# Patient Record
Sex: MALE
[De-identification: names, ages, dates, MRNs, and addresses within clinical notes are randomized per-mention and may not be internally consistent; named-entity substitution may affect disease eponyms.]

---

## 2023-09-19 ENCOUNTER — RX ONLY (RX ONLY)
Age: 68
End: 2023-09-19

## 2023-09-19 ENCOUNTER — OFFICE (OUTPATIENT)
Facility: LOCATION | Age: 68
Setting detail: OPHTHALMOLOGY
End: 2023-09-19
Payer: MEDICARE

## 2023-09-19 DIAGNOSIS — H35.033: ICD-10-CM

## 2023-09-19 DIAGNOSIS — H11.153: ICD-10-CM

## 2023-09-19 DIAGNOSIS — Z13.5: ICD-10-CM

## 2023-09-19 DIAGNOSIS — H52.4: ICD-10-CM

## 2023-09-19 DIAGNOSIS — H25.13: ICD-10-CM

## 2023-09-19 DIAGNOSIS — H16.223: ICD-10-CM

## 2023-09-19 DIAGNOSIS — H52.03: ICD-10-CM

## 2023-09-19 PROBLEM — H01.005 BLEPHARITIS; RIGHT UPPER LID, RIGHT LOWER LID, LEFT UPPER LID, LEFT LOWER LID: Status: ACTIVE | Noted: 2023-09-19

## 2023-09-19 PROBLEM — H01.002 BLEPHARITIS; RIGHT UPPER LID, RIGHT LOWER LID, LEFT UPPER LID, LEFT LOWER LID: Status: ACTIVE | Noted: 2023-09-19

## 2023-09-19 PROBLEM — H02.83 DERMATOCHALASIS ; BOTH EYES: Status: ACTIVE | Noted: 2023-09-19

## 2023-09-19 PROBLEM — H01.001 BLEPHARITIS; RIGHT UPPER LID, RIGHT LOWER LID, LEFT UPPER LID, LEFT LOWER LID: Status: ACTIVE | Noted: 2023-09-19

## 2023-09-19 PROBLEM — H01.004 BLEPHARITIS; RIGHT UPPER LID, RIGHT LOWER LID, LEFT UPPER LID, LEFT LOWER LID: Status: ACTIVE | Noted: 2023-09-19

## 2023-09-19 PROCEDURE — 92250 FUNDUS PHOTOGRAPHY W/I&R: CPT | Performed by: OPHTHALMOLOGY

## 2023-09-19 PROCEDURE — 92015 DETERMINE REFRACTIVE STATE: CPT | Performed by: OPHTHALMOLOGY

## 2023-09-19 PROCEDURE — 92014 COMPRE OPH EXAM EST PT 1/>: CPT | Performed by: OPHTHALMOLOGY

## 2023-09-19 ASSESSMENT — SUPERFICIAL PUNCTATE KERATITIS (SPK)
OS_SPK: 1+
OD_SPK: 1+

## 2023-09-19 ASSESSMENT — LID EXAM ASSESSMENTS
OS_BLEPHARITIS: LLL LUL 1+
OD_BLEPHARITIS: RLL RUL 1+

## 2023-09-19 ASSESSMENT — LID POSITION - DERMATOCHALASIS
OS_DERMATOCHALASIS: T
OD_DERMATOCHALASIS: T

## 2023-09-19 ASSESSMENT — CONFRONTATIONAL VISUAL FIELD TEST (CVF)
OD_FINDINGS: FULL
OS_FINDINGS: FULL

## 2023-09-19 ASSESSMENT — TONOMETRY
OD_IOP_MMHG: 16
OS_IOP_MMHG: 16

## 2023-09-21 ASSESSMENT — REFRACTION_CURRENTRX
OD_ADD: +2.25
OS_ADD: +2.25
OD_CYLINDER: +0.75
OS_AXIS: 6
OD_AXIS: 5
OD_OVR_VA: 20/
OD_VPRISM_DIRECTION: PROGS
OS_SPHERE: +1.25
OD_SPHERE: +1.25
OS_VPRISM_DIRECTION: PROGS
OS_OVR_VA: 20/
OS_CYLINDER: +0.50

## 2023-09-21 ASSESSMENT — REFRACTION_MANIFEST
OD_AXIS: 10
OD_VA1: 20/20-1
OD_ADD: +2.25
OS_CYLINDER: +0.50
OS_AXIS: 155
OD_SPHERE: +1.50
OS_SPHERE: +1.75
OS_ADD: +2.25
OS_VA1: 20/20-1
OD_CYLINDER: +1.00

## 2023-09-21 ASSESSMENT — REFRACTION_AUTOREFRACTION
OD_AXIS: 12
OS_CYLINDER: +1.25
OS_AXIS: 16
OD_SPHERE: +2.00
OS_SPHERE: +1.00
OD_CYLINDER: +1.00

## 2023-09-21 ASSESSMENT — SPHEQUIV_DERIVED
OD_SPHEQUIV: 2.5
OS_SPHEQUIV: 1.625
OD_SPHEQUIV: 2
OS_SPHEQUIV: 2

## 2023-09-21 ASSESSMENT — VISUAL ACUITY
OD_BCVA: 20/25-2
OS_BCVA: 20/20-2

## 2024-08-13 PROBLEM — Z00.00 ENCOUNTER FOR PREVENTIVE HEALTH EXAMINATION: Status: ACTIVE | Noted: 2024-08-13

## 2024-08-14 ENCOUNTER — APPOINTMENT (OUTPATIENT)
Dept: ORTHOPEDIC SURGERY | Facility: CLINIC | Age: 69
End: 2024-08-14
Payer: MEDICARE

## 2024-08-14 VITALS — WEIGHT: 160 LBS | BODY MASS INDEX: 25.71 KG/M2 | HEIGHT: 66 IN

## 2024-08-14 DIAGNOSIS — I10 ESSENTIAL (PRIMARY) HYPERTENSION: ICD-10-CM

## 2024-08-14 DIAGNOSIS — Z78.9 OTHER SPECIFIED HEALTH STATUS: ICD-10-CM

## 2024-08-14 DIAGNOSIS — Z86.69 PERSONAL HISTORY OF OTHER DISEASES OF THE NERVOUS SYSTEM AND SENSE ORGANS: ICD-10-CM

## 2024-08-14 DIAGNOSIS — M75.21 BICIPITAL TENDINITIS, RIGHT SHOULDER: ICD-10-CM

## 2024-08-14 PROCEDURE — 99204 OFFICE O/P NEW MOD 45 MIN: CPT

## 2024-08-14 RX ORDER — AMLODIPINE BESYLATE 5 MG/1
TABLET ORAL
Refills: 0 | Status: ACTIVE | COMMUNITY

## 2024-08-14 RX ORDER — ATORVASTATIN CALCIUM 80 MG/1
TABLET, FILM COATED ORAL
Refills: 0 | Status: ACTIVE | COMMUNITY

## 2024-08-14 NOTE — HISTORY OF PRESENT ILLNESS
[de-identified] : Age: 68 year M PMHx: Sleep apnea, hypertension Hand Dominance: RHD Chief Complaint: Patient presents for [facility] elbow pain evaluation. Patient states he was angry and slammed his fists down on a table 8/4/24 and he felt a pop in his elbow. Patient doesn't have pain when bending his arm, just pain twisting and weightbearing. Patient states his RT elbow is worse but is also having pain in his LT elbow. Patient states he was having a lot of pain playing golf a week later. Patient went to [facility] and was told nothing is broken. Patient has been icing, using a compression sleeve and taking Advil prn. Patient denies numbness/tingling. Trauma: Yes, 8/4/24 Outside Imaging/Treatment: x-rays at [facility] OTC Medications: advil OT/PT: none Bracing: compression sleeve Pain worse with: weightbearing, twisting Pain better with: ice, compression, advil

## 2024-08-14 NOTE — ASSESSMENT
[FreeTextEntry1] : Right distal biceps tendonitis - reviewed radiographs and pathoanatomy with patient. Discussed management to consist of NSAIDs prn, OT/PT, activity modification.  F/u prn

## 2024-08-14 NOTE — IMAGING
[de-identified] : RIGHT ELBOW EXAM +ttp at distal biceps tendon, discomfort with resisted supination. Palpable distal tendon. Skin intact No deformity, edema, or ecchymosis Hand with brisk capillary refill, warm and well perfused Motor function intact to AIN, PIN, ulnar nerves Sensation intact median, ulnar, radial nerves  Elbow ROM   Flexion 135   Extension to 0   Supination 85   Pronation 85  No elbow instability noted Strength for elbow flexion & extension is 5/5 Hand and wrist motion is intact and full Patient able to make a composite fist.  Right elbow radiographs with no fracture nor dislocation. No arthrosis. (3-view as viewed from outside facility)

## 2024-11-12 ENCOUNTER — OFFICE (OUTPATIENT)
Facility: LOCATION | Age: 69
Setting detail: OPHTHALMOLOGY
End: 2024-11-12
Payer: MEDICARE

## 2024-11-12 DIAGNOSIS — D23.112: ICD-10-CM

## 2024-11-12 PROBLEM — H40.11: Status: ACTIVE | Noted: 2024-11-12

## 2024-11-12 PROCEDURE — 99213 OFFICE O/P EST LOW 20 MIN: CPT | Performed by: OPHTHALMOLOGY

## 2024-11-12 PROCEDURE — 92285 EXTERNAL OCULAR PHOTOGRAPHY: CPT | Mod: RT | Performed by: OPHTHALMOLOGY

## 2024-11-12 ASSESSMENT — CONFRONTATIONAL VISUAL FIELD TEST (CVF)
OD_FINDINGS: FULL
OS_FINDINGS: FULL

## 2024-11-12 ASSESSMENT — LID EXAM ASSESSMENTS
OS_BLEPHARITIS: LLL LUL 1+
OD_BLEPHARITIS: RLL RUL 1+

## 2024-11-12 ASSESSMENT — SUPERFICIAL PUNCTATE KERATITIS (SPK)
OD_SPK: 1+
OS_SPK: 1+

## 2024-11-12 ASSESSMENT — LID POSITION - DERMATOCHALASIS
OS_DERMATOCHALASIS: T
OD_DERMATOCHALASIS: T

## 2024-11-14 ASSESSMENT — REFRACTION_CURRENTRX
OS_SPHERE: +1.75
OD_VPRISM_DIRECTION: PROGS
OS_CYLINDER: +0.25
OS_AXIS: 155
OD_ADD: +2.25
OD_SPHERE: +1.50
OD_CYLINDER: +1.00
OD_OVR_VA: 20/
OD_AXIS: 024
OS_ADD: +2.25
OS_OVR_VA: 20/
OS_VPRISM_DIRECTION: PROGS

## 2024-11-14 ASSESSMENT — REFRACTION_MANIFEST
OS_VA1: 20/20-1
OD_ADD: +2.25
OS_SPHERE: +1.75
OD_CYLINDER: +1.00
OD_SPHERE: +1.50
OS_AXIS: 155
OS_ADD: +2.25
OD_VA1: 20/20-1
OS_CYLINDER: +0.50
OD_AXIS: 10

## 2024-11-14 ASSESSMENT — REFRACTION_AUTOREFRACTION
OD_SPHERE: +2.00
OS_SPHERE: +1.00
OD_CYLINDER: +1.00
OD_AXIS: 12
OS_AXIS: 16
OS_CYLINDER: +1.25

## 2024-11-14 ASSESSMENT — VISUAL ACUITY
OD_BCVA: 20/20-2
OS_BCVA: 20/25

## 2025-01-03 ENCOUNTER — OFFICE (OUTPATIENT)
Facility: LOCATION | Age: 70
Setting detail: OPHTHALMOLOGY
End: 2025-01-03
Payer: MEDICARE

## 2025-01-03 DIAGNOSIS — D23.112: ICD-10-CM

## 2025-01-03 PROCEDURE — 11440 EXC FACE-MM B9+MARG 0.5 CM/<: CPT | Mod: E4 | Performed by: OPHTHALMOLOGY

## 2025-01-06 ASSESSMENT — LID POSITION - DERMATOCHALASIS
OS_DERMATOCHALASIS: T
OD_DERMATOCHALASIS: T

## 2025-01-06 ASSESSMENT — LID EXAM ASSESSMENTS
OS_BLEPHARITIS: LLL LUL 1+
OD_BLEPHARITIS: RLL RUL 1+

## 2025-01-06 ASSESSMENT — SUPERFICIAL PUNCTATE KERATITIS (SPK)
OD_SPK: 1+
OS_SPK: 1+

## 2025-01-16 ASSESSMENT — REFRACTION_MANIFEST
OS_SPHERE: +1.75
OS_ADD: +2.25
OS_VA1: 20/20-1
OS_AXIS: 155
OS_CYLINDER: +0.50
OD_AXIS: 10
OD_ADD: +2.25
OD_VA1: 20/20-1
OD_SPHERE: +1.50
OD_CYLINDER: +1.00

## 2025-01-16 ASSESSMENT — REFRACTION_AUTOREFRACTION
OD_AXIS: 12
OS_AXIS: 16
OD_CYLINDER: +1.00
OS_CYLINDER: +1.25
OD_SPHERE: +2.00
OS_SPHERE: +1.00

## 2025-01-16 ASSESSMENT — VISUAL ACUITY
OS_BCVA: 20/20
OD_BCVA: 20/20

## 2025-01-16 ASSESSMENT — REFRACTION_CURRENTRX
OS_ADD: +2.25
OD_SPHERE: +1.50
OS_OVR_VA: 20/
OD_VPRISM_DIRECTION: PROGS
OD_ADD: +2.25
OD_OVR_VA: 20/
OS_AXIS: 155
OD_AXIS: 024
OS_CYLINDER: +0.25
OS_VPRISM_DIRECTION: PROGS
OD_CYLINDER: +1.00
OS_SPHERE: +1.75

## 2025-02-12 ENCOUNTER — OFFICE (OUTPATIENT)
Facility: LOCATION | Age: 70
Setting detail: OPHTHALMOLOGY
End: 2025-02-12
Payer: MEDICARE

## 2025-02-12 DIAGNOSIS — D23.112: ICD-10-CM

## 2025-02-12 DIAGNOSIS — H25.13: ICD-10-CM

## 2025-02-12 PROBLEM — H01.004 BLEPHARITIS; RIGHT UPPER LID, RIGHT LOWER LID, LEFT UPPER LID, LEFT LOWER LID: Status: ACTIVE | Noted: 2025-02-12

## 2025-02-12 PROBLEM — H01.001 BLEPHARITIS; RIGHT UPPER LID, RIGHT LOWER LID, LEFT UPPER LID, LEFT LOWER LID: Status: ACTIVE | Noted: 2025-02-12

## 2025-02-12 PROBLEM — H01.002 BLEPHARITIS; RIGHT UPPER LID, RIGHT LOWER LID, LEFT UPPER LID, LEFT LOWER LID: Status: ACTIVE | Noted: 2025-02-12

## 2025-02-12 PROBLEM — H02.83 DERMATOCHALSIS ; BOTH EYES: Status: ACTIVE | Noted: 2025-02-12

## 2025-02-12 PROBLEM — H01.005 BLEPHARITIS; RIGHT UPPER LID, RIGHT LOWER LID, LEFT UPPER LID, LEFT LOWER LID: Status: ACTIVE | Noted: 2025-02-12

## 2025-02-12 PROCEDURE — 99213 OFFICE O/P EST LOW 20 MIN: CPT | Performed by: OPHTHALMOLOGY

## 2025-02-12 ASSESSMENT — LID EXAM ASSESSMENTS
OD_BLEPHARITIS: RLL RUL 1+
OS_BLEPHARITIS: LLL LUL 1+

## 2025-02-12 ASSESSMENT — SUPERFICIAL PUNCTATE KERATITIS (SPK)
OD_SPK: 1+
OS_SPK: 1+

## 2025-02-12 ASSESSMENT — CONFRONTATIONAL VISUAL FIELD TEST (CVF)
OD_FINDINGS: FULL
OS_FINDINGS: FULL

## 2025-02-12 ASSESSMENT — LID POSITION - DERMATOCHALASIS
OS_DERMATOCHALASIS: T
OD_DERMATOCHALASIS: T

## 2025-02-13 ASSESSMENT — REFRACTION_MANIFEST
OD_SPHERE: +1.50
OS_ADD: +2.25
OD_AXIS: 10
OS_SPHERE: +1.75
OS_VA1: 20/20-1
OS_AXIS: 155
OD_ADD: +2.25
OD_CYLINDER: +1.00
OS_CYLINDER: +0.50
OD_VA1: 20/20-1

## 2025-02-13 ASSESSMENT — REFRACTION_AUTOREFRACTION
OD_AXIS: 12
OS_SPHERE: +1.75
OD_SPHERE: +2.00
OD_CYLINDER: +0.75
OS_CYLINDER: +0.75
OS_AXIS: 162

## 2025-02-13 ASSESSMENT — REFRACTION_CURRENTRX
OS_AXIS: 155
OD_SPHERE: +1.50
OS_OVR_VA: 20/
OS_CYLINDER: +0.25
OS_SPHERE: +1.75
OS_ADD: +2.25
OS_VPRISM_DIRECTION: PROGS
OD_AXIS: 024
OD_ADD: +2.25
OD_CYLINDER: +1.00
OD_VPRISM_DIRECTION: PROGS
OD_OVR_VA: 20/

## 2025-02-13 ASSESSMENT — VISUAL ACUITY
OD_BCVA: 20/20
OS_BCVA: 20/20-2

## 2025-06-23 ENCOUNTER — APPOINTMENT (OUTPATIENT)
Dept: ORTHOPEDIC SURGERY | Facility: CLINIC | Age: 70
End: 2025-06-23

## 2025-06-23 VITALS — BODY MASS INDEX: 24.91 KG/M2 | WEIGHT: 155 LBS | HEIGHT: 66 IN

## 2025-06-23 PROBLEM — Z78.9 NO PERTINENT FAMILY HISTORY: Status: ACTIVE | Noted: 2025-06-23

## 2025-06-23 PROBLEM — M25.561 ACUTE PAIN OF RIGHT KNEE: Status: ACTIVE | Noted: 2025-06-23

## 2025-06-23 PROBLEM — M25.561 RIGHT KNEE PAIN: Status: ACTIVE | Noted: 2025-06-23

## 2025-06-23 PROBLEM — Z78.9 SOCIAL ALCOHOL USE: Status: ACTIVE | Noted: 2025-06-23

## 2025-06-23 PROCEDURE — 73564 X-RAY EXAM KNEE 4 OR MORE: CPT | Mod: RT

## 2025-06-23 PROCEDURE — 99214 OFFICE O/P EST MOD 30 MIN: CPT

## 2025-06-23 RX ORDER — MELOXICAM 15 MG/1
15 TABLET ORAL
Qty: 21 | Refills: 0 | Status: ACTIVE | COMMUNITY
Start: 2025-06-23 | End: 1900-01-01

## 2025-08-12 ENCOUNTER — OFFICE (OUTPATIENT)
Facility: LOCATION | Age: 70
Setting detail: OPHTHALMOLOGY
End: 2025-08-12
Payer: MEDICARE

## 2025-08-12 DIAGNOSIS — Z13.5: ICD-10-CM

## 2025-08-12 DIAGNOSIS — H11.153: ICD-10-CM

## 2025-08-12 DIAGNOSIS — H35.033: ICD-10-CM

## 2025-08-12 DIAGNOSIS — H25.13: ICD-10-CM

## 2025-08-12 DIAGNOSIS — H16.223: ICD-10-CM

## 2025-08-12 PROBLEM — H02.834 DERMATOCHALASIS; RIGHT UPPER LID, LEFT UPPER LID: Status: ACTIVE | Noted: 2025-08-12

## 2025-08-12 PROBLEM — H02.831 DERMATOCHALASIS; RIGHT UPPER LID, LEFT UPPER LID: Status: ACTIVE | Noted: 2025-08-12

## 2025-08-12 PROCEDURE — 92250 FUNDUS PHOTOGRAPHY W/I&R: CPT | Mod: GY | Performed by: OPHTHALMOLOGY

## 2025-08-12 PROCEDURE — 92014 COMPRE OPH EXAM EST PT 1/>: CPT | Performed by: OPHTHALMOLOGY

## 2025-08-12 ASSESSMENT — SUPERFICIAL PUNCTATE KERATITIS (SPK)
OD_SPK: 1+
OS_SPK: 1+

## 2025-08-12 ASSESSMENT — LID POSITION - DERMATOCHALASIS
OD_DERMATOCHALASIS: T
OS_DERMATOCHALASIS: T

## 2025-08-12 ASSESSMENT — REFRACTION_CURRENTRX
OS_CYLINDER: +0.25
OD_VPRISM_DIRECTION: PROGS
OD_AXIS: 024
OS_AXIS: 155
OS_SPHERE: +1.75
OS_ADD: +2.25
OS_VPRISM_DIRECTION: PROGS
OS_OVR_VA: 20/
OD_CYLINDER: +1.00
OD_SPHERE: +1.50
OD_OVR_VA: 20/
OD_ADD: +2.25

## 2025-08-12 ASSESSMENT — REFRACTION_MANIFEST
OD_CYLINDER: +1.00
OS_SPHERE: +1.75
OS_CYLINDER: +0.50
OD_SPHERE: +1.50
OS_ADD: +2.25
OD_VA1: 20/20-1
OD_AXIS: 10
OS_VA1: 20/20-1
OS_AXIS: 155
OD_ADD: +2.25

## 2025-08-12 ASSESSMENT — CONFRONTATIONAL VISUAL FIELD TEST (CVF)
OD_FINDINGS: FULL
OS_FINDINGS: FULL

## 2025-08-12 ASSESSMENT — REFRACTION_AUTOREFRACTION
OD_SPHERE: +1.75
OD_AXIS: 18
OS_SPHERE: +1.75
OS_AXIS: 164
OD_CYLINDER: +0.75
OS_CYLINDER: +0.75

## 2025-08-12 ASSESSMENT — VISUAL ACUITY
OS_BCVA: 20/20
OD_BCVA: 20/20

## 2025-08-12 ASSESSMENT — LID EXAM ASSESSMENTS
OS_BLEPHARITIS: LLL LUL 1+
OD_BLEPHARITIS: RLL RUL 1+

## 2025-09-11 ENCOUNTER — APPOINTMENT (OUTPATIENT)
Dept: ORTHOPEDIC SURGERY | Facility: CLINIC | Age: 70
End: 2025-09-11
Payer: MEDICARE

## 2025-09-11 DIAGNOSIS — M17.11 UNILATERAL PRIMARY OSTEOARTHRITIS, RIGHT KNEE: ICD-10-CM

## 2025-09-11 PROCEDURE — 99204 OFFICE O/P NEW MOD 45 MIN: CPT

## 2025-09-11 PROCEDURE — 99214 OFFICE O/P EST MOD 30 MIN: CPT

## 2025-09-11 RX ORDER — MELOXICAM 15 MG/1
15 TABLET ORAL
Qty: 21 | Refills: 0 | Status: ACTIVE | COMMUNITY
Start: 2025-09-11 | End: 1900-01-01

## 2025-09-15 ENCOUNTER — NON-APPOINTMENT (OUTPATIENT)
Age: 70
End: 2025-09-15